# Patient Record
Sex: FEMALE | Race: BLACK OR AFRICAN AMERICAN | NOT HISPANIC OR LATINO | URBAN - METROPOLITAN AREA
[De-identification: names, ages, dates, MRNs, and addresses within clinical notes are randomized per-mention and may not be internally consistent; named-entity substitution may affect disease eponyms.]

---

## 2019-02-05 ENCOUNTER — IMPORTED ENCOUNTER (OUTPATIENT)
Dept: URBAN - METROPOLITAN AREA CLINIC 50 | Facility: CLINIC | Age: 49
End: 2019-02-05

## 2020-01-06 NOTE — PATIENT DISCUSSION
Astigmatism OU and may warrant Toric lenses. Patient may be candidate for MF lenses will need further testing.

## 2020-02-06 NOTE — PATIENT DISCUSSION
The risks, benefits and alternatives of cataract surgery were discussed with the patient. Risks including but not limited to: Infection, retinal detachment, lens dislocation, inflammation, loss of vision, increased pressure and need for further surgery. We discussed all the lens options including monofocal lens, toric lens, multifocal lens, astigmatism correction and other options. The patient understands that they may need glasses for optimal vision with any option. Patient enjoys using the computer and does not currently wear glasses for intermediate or near. Advised patient that cataracts have shifted vision to be more myopic. Patient does read the newspaper but uses the computer the majority of the time, does not sit down to read a book. Patient would like independence from glasses for the majority of the time. Discussed halos and glare around lights at night, patient does not drive much at night and understands risks associated with ROF IOLs.

## 2020-03-17 NOTE — PATIENT DISCUSSION
Retinal tear and detachment warning symptoms reviewed and patient instructed to call immediately if increasing floaters, flashes, or decreasing peripheral vision. car

## 2021-04-17 ASSESSMENT — VISUAL ACUITY
OD_PH: 20/40
OD_SC: 20/40
OS_PH: 20/40
OS_SC: 20/50
OD_CC: J1@ 14 IN
OS_CC: J1@ 14 IN

## 2021-04-17 ASSESSMENT — TONOMETRY
OS_IOP_MMHG: 17
OD_IOP_MMHG: 18

## 2024-02-03 ENCOUNTER — COMPREHENSIVE EXAM (OUTPATIENT)
Dept: URBAN - METROPOLITAN AREA CLINIC 52 | Facility: CLINIC | Age: 54
End: 2024-02-03

## 2024-02-03 DIAGNOSIS — H52.4: ICD-10-CM

## 2024-02-03 DIAGNOSIS — H40.013: ICD-10-CM

## 2024-02-03 DIAGNOSIS — Z01.01: ICD-10-CM

## 2024-02-03 PROCEDURE — 92014 COMPRE OPH EXAM EST PT 1/>: CPT

## 2024-02-03 PROCEDURE — 92015 DETERMINE REFRACTIVE STATE: CPT

## 2024-02-03 ASSESSMENT — KERATOMETRY
OS_K2POWER_DIOPTERS: 45.50
OS_AXISANGLE2_DEGREES: 93
OD_AXISANGLE_DEGREES: 009
OD_AXISANGLE2_DEGREES: 99
OD_K2POWER_DIOPTERS: 45.00
OS_K1POWER_DIOPTERS: 45.00
OD_K1POWER_DIOPTERS: 44.00
OS_AXISANGLE_DEGREES: 003

## 2024-02-03 ASSESSMENT — VISUAL ACUITY
OD_GLARE: 20/25
OD_GLARE: 20/25
OU_CC: 20/20
OS_CC: 20/20-1
OU_CC: J1+@16"
OD_CC: 20/20-1

## 2024-02-03 ASSESSMENT — TONOMETRY
OD_IOP_MMHG: 18
OS_IOP_MMHG: 19